# Patient Record
Sex: MALE
[De-identification: names, ages, dates, MRNs, and addresses within clinical notes are randomized per-mention and may not be internally consistent; named-entity substitution may affect disease eponyms.]

---

## 2019-03-05 ENCOUNTER — APPOINTMENT (OUTPATIENT)
Dept: OTOLARYNGOLOGY | Facility: CLINIC | Age: 59
End: 2019-03-05
Payer: COMMERCIAL

## 2019-03-05 VITALS
DIASTOLIC BLOOD PRESSURE: 70 MMHG | HEIGHT: 70 IN | WEIGHT: 175 LBS | SYSTOLIC BLOOD PRESSURE: 111 MMHG | HEART RATE: 56 BPM | BODY MASS INDEX: 25.05 KG/M2

## 2019-03-05 DIAGNOSIS — Z86.69 PERSONAL HISTORY OF OTHER DISEASES OF THE NERVOUS SYSTEM AND SENSE ORGANS: ICD-10-CM

## 2019-03-05 PROBLEM — Z00.00 ENCOUNTER FOR PREVENTIVE HEALTH EXAMINATION: Status: ACTIVE | Noted: 2019-03-05

## 2019-03-05 PROCEDURE — 69801 INCISE INNER EAR: CPT | Mod: RT

## 2019-03-05 PROCEDURE — 99204 OFFICE O/P NEW MOD 45 MIN: CPT | Mod: 25

## 2019-03-05 PROCEDURE — 92504 EAR MICROSCOPY EXAMINATION: CPT

## 2019-03-05 NOTE — REASON FOR VISIT
[Spouse] : spouse [Initial Consultation] : an initial consultation for [FreeTextEntry2] : sudden b/l hearing loss

## 2019-03-05 NOTE — DATA REVIEWED
[de-identified] : jason REYNOLDS, R sudden loss 7 days ago [de-identified] : no IAC/CPA lesions, reviewed disc with patient

## 2019-03-05 NOTE — HISTORY OF PRESENT ILLNESS
[Anxiety] : anxiety [Hearing Loss] : hearing loss [de-identified] : 59 y/o male presents with spouse for evaluation of sudden b/l hearing loss. Pt has hx of hearing loss and is seen by ENT (Murray Mccullough MD in [place]) who states his hearing loss is 65% Right and 35% Left. Last Tuesday (2/26/19) sudden hearing loss was noted, unable to hear in Right ear and minimal in Left. Pt saw same ENT on day of hearing loss, who referred pt here and  placed pt on PO steroids without any improvement. Pt denies any recent colds, ear infections, pain, drainage, h/a, some dizziness when waking up. Pt does snore at night. Pt states spoken words lacks clarity.  [Ear Fullness] : no ear fullness [Tinnitus] : no tinnitus [Dizziness] : no dizziness [Headache] : no headache [Lightheadedness] : no lightheadedness [Neurologic Symptoms] : no associated neurologic symptoms [Orthostatic Hypotension] : no orthostatic hypotension [Otalgia] : no otalgia [Otorrhea] : no otorrhea [Vertigo] : no vertigo [Visual Changes] : no visual changes

## 2019-03-05 NOTE — PROCEDURE
[Risk and Benefits Discussed] : The purpose, risks, discomforts, benefits and alternatives of the procedure have been explained to the patient including no treatment. [facility] [Sudden Hearing Loss] : Sudden Hearing Loss [Same] : same as the Pre Op Dx. [] : Binocular Microscopy [FreeTextEntry1] : R sudden loss [FreeTextEntry4] : topical phenol [FreeTextEntry6] : After appropriate consent and identification of laterailty for procedure, the patient placed in supine position with head turned. Topical phenol utilized to anesthetize superior aspect of tympanic membrane. Using a 25g needle a small ventilating hole made followed by inferiorly placed injection site. Solumedrol was slowly injected intratympanically, until the entire middle ear was filled. Patient remained supine with head turned for at least 20 minutes with instruction to avoid swallowing or talking. Tolerated the procedure well without complications.\par

## 2019-03-19 ENCOUNTER — APPOINTMENT (OUTPATIENT)
Dept: OTOLARYNGOLOGY | Facility: CLINIC | Age: 59
End: 2019-03-19
Payer: COMMERCIAL

## 2019-03-19 VITALS
HEIGHT: 70 IN | WEIGHT: 175 LBS | BODY MASS INDEX: 25.05 KG/M2 | SYSTOLIC BLOOD PRESSURE: 119 MMHG | HEART RATE: 66 BPM | DIASTOLIC BLOOD PRESSURE: 79 MMHG

## 2019-03-19 DIAGNOSIS — Z87.891 PERSONAL HISTORY OF NICOTINE DEPENDENCE: ICD-10-CM

## 2019-03-19 PROCEDURE — 99213 OFFICE O/P EST LOW 20 MIN: CPT | Mod: 25

## 2019-03-19 PROCEDURE — 69801 INCISE INNER EAR: CPT | Mod: RT

## 2019-03-19 PROCEDURE — 92504 EAR MICROSCOPY EXAMINATION: CPT

## 2019-03-19 RX ORDER — PREDNISONE 20 MG/1
20 TABLET ORAL
Refills: 0 | Status: DISCONTINUED | COMMUNITY
End: 2019-03-19

## 2019-03-20 NOTE — REASON FOR VISIT
[Subsequent Evaluation] : a subsequent evaluation for [Spouse] : spouse [FreeTextEntry2] : b/l hearing loss

## 2019-03-20 NOTE — HISTORY OF PRESENT ILLNESS
[de-identified] : 59 y/o here for f/u for sudden R SNHL.  Pt finished course of PO steroids 20mg. Pt and spouse think the hearing is better in the right ear but with close proximity and not when talking far away. Pt still c/o of "noises" in both ears. Pt still awaiting to be called in for L HA consult. Pt has not returned to work.

## 2019-03-20 NOTE — PROCEDURE
[Risk and Benefits Discussed] : The purpose, risks, discomforts, benefits and alternatives of the procedure have been explained to the patient including no treatment. [facility] [Sudden Hearing Loss] : Sudden Hearing Loss [Same] : same as the Pre Op Dx. [] : Binocular Microscopy [FreeTextEntry1] : [facility]IR[facility] [FreeTextEntry4] : topical phenol [FreeTextEntry6] : After appropriate consent and identification of laterailty for procedure, the patient placed in supine position with head turned. Topical phenol utilized to anesthetize superior aspect of tympanic membrane. Using a 25g needle a small ventilating hole made followed by inferiorly placed injection site. Solumedrol was slowly injected intratympanically, until the entire middle ear was filled. Patient remained supine with head turned for at least 10 minutes with instruction to avoid swallowing or talking. Tolerated the procedure well without complications.\par

## 2019-04-02 ENCOUNTER — APPOINTMENT (OUTPATIENT)
Dept: OTOLARYNGOLOGY | Facility: CLINIC | Age: 59
End: 2019-04-02
Payer: COMMERCIAL

## 2019-04-02 VITALS
SYSTOLIC BLOOD PRESSURE: 106 MMHG | WEIGHT: 175 LBS | HEIGHT: 70 IN | HEART RATE: 64 BPM | DIASTOLIC BLOOD PRESSURE: 70 MMHG | BODY MASS INDEX: 25.05 KG/M2

## 2019-04-02 PROCEDURE — 92557 COMPREHENSIVE HEARING TEST: CPT

## 2019-04-02 PROCEDURE — 99213 OFFICE O/P EST LOW 20 MIN: CPT | Mod: 25

## 2019-04-02 PROCEDURE — 92567 TYMPANOMETRY: CPT

## 2019-04-02 NOTE — REASON FOR VISIT
[Subsequent Evaluation] : a subsequent evaluation for [Spouse] : spouse [FreeTextEntry2] : f/u for hearing loss in the left ear

## 2019-04-02 NOTE — HISTORY OF PRESENT ILLNESS
[de-identified] : 57 y/o male here f/u for hearing loss in the left ear. Pt with 2 IT steroid injections during last visit, each time.  Pt states his hearing fluctuates day to day; overall has noted some improvement. Pt went to a dispensary in [place] to try out left HA.

## 2019-07-30 ENCOUNTER — APPOINTMENT (OUTPATIENT)
Dept: OTOLARYNGOLOGY | Facility: CLINIC | Age: 59
End: 2019-07-30
Payer: COMMERCIAL

## 2019-07-30 VITALS
HEIGHT: 70 IN | BODY MASS INDEX: 25.05 KG/M2 | HEART RATE: 62 BPM | DIASTOLIC BLOOD PRESSURE: 73 MMHG | SYSTOLIC BLOOD PRESSURE: 112 MMHG | WEIGHT: 175 LBS

## 2019-07-30 PROCEDURE — 92567 TYMPANOMETRY: CPT

## 2019-07-30 PROCEDURE — 99213 OFFICE O/P EST LOW 20 MIN: CPT | Mod: 25

## 2019-07-30 PROCEDURE — 92557 COMPREHENSIVE HEARING TEST: CPT

## 2019-07-30 NOTE — REASON FOR VISIT
[Subsequent Evaluation] : a subsequent evaluation for [FreeTextEntry2] : follow up bilateral hearing loss

## 2019-07-30 NOTE — HISTORY OF PRESENT ILLNESS
[de-identified] : 58 year old male follow up bilateral hearing loss, denies improvement since last visit. Bilateral hearing aids since March 2019. Denies otalgia, otorrhea, ear infections, tinnitus, dizziness, vertigo, headaches related to hearing. Last audiogram 4/2/19.\par \par

## 2019-08-26 ENCOUNTER — APPOINTMENT (OUTPATIENT)
Dept: PHARMACY | Facility: CLINIC | Age: 59
End: 2019-08-26
Payer: COMMERCIAL

## 2019-08-26 PROCEDURE — V5010 ASSESSMENT FOR HEARING AID: CPT | Mod: NC

## 2020-09-02 ENCOUNTER — APPOINTMENT (OUTPATIENT)
Dept: OTOLARYNGOLOGY | Facility: CLINIC | Age: 60
End: 2020-09-02
Payer: MEDICAID

## 2020-09-02 VITALS
HEART RATE: 62 BPM | DIASTOLIC BLOOD PRESSURE: 69 MMHG | SYSTOLIC BLOOD PRESSURE: 114 MMHG | HEIGHT: 70 IN | BODY MASS INDEX: 24.34 KG/M2 | WEIGHT: 170 LBS

## 2020-09-02 PROCEDURE — 92557 COMPREHENSIVE HEARING TEST: CPT

## 2020-09-02 PROCEDURE — 92567 TYMPANOMETRY: CPT

## 2020-09-02 PROCEDURE — 99214 OFFICE O/P EST MOD 30 MIN: CPT | Mod: 25

## 2020-09-02 RX ORDER — ALPRAZOLAM 2 MG/1
TABLET ORAL
Refills: 0 | Status: DISCONTINUED | COMMUNITY
End: 2020-09-02

## 2020-09-02 NOTE — HISTORY OF PRESENT ILLNESS
[de-identified] : 59 year old man follow up bilateral hearing loss.  Wearing bilateral hearing aids. States hasn't been able to get used to the hearing aids.  Patient denies otalgia, otorrhea, ear infections, tinnitus, dizziness, vertigo, headaches related to hearing.\par

## 2020-09-02 NOTE — REASON FOR VISIT
[Subsequent Evaluation] : a subsequent evaluation for [FreeTextEntry2] : follow up for bilateral hearing loss

## 2020-10-06 ENCOUNTER — OUTPATIENT (OUTPATIENT)
Dept: OUTPATIENT SERVICES | Facility: HOSPITAL | Age: 60
LOS: 1 days | Discharge: HOME | End: 2020-10-06

## 2020-10-06 ENCOUNTER — APPOINTMENT (OUTPATIENT)
Dept: SPEECH THERAPY | Facility: CLINIC | Age: 60
End: 2020-10-06

## 2020-10-15 DIAGNOSIS — H90.3 SENSORINEURAL HEARING LOSS, BILATERAL: ICD-10-CM

## 2021-03-15 ENCOUNTER — APPOINTMENT (OUTPATIENT)
Dept: PHARMACY | Facility: CLINIC | Age: 61
End: 2021-03-15

## 2021-10-26 ENCOUNTER — APPOINTMENT (OUTPATIENT)
Dept: OTOLARYNGOLOGY | Facility: CLINIC | Age: 61
End: 2021-10-26
Payer: MEDICAID

## 2021-10-26 PROCEDURE — 92557 COMPREHENSIVE HEARING TEST: CPT

## 2021-10-26 PROCEDURE — 92504 EAR MICROSCOPY EXAMINATION: CPT

## 2021-10-26 PROCEDURE — 92567 TYMPANOMETRY: CPT

## 2021-10-26 PROCEDURE — 99213 OFFICE O/P EST LOW 20 MIN: CPT

## 2021-10-26 NOTE — HISTORY OF PRESENT ILLNESS
[de-identified] : 60 year old man follow up bilateral hearing loss.  Wearing bilateral hearing aids. States hasn't been able to get used to the hearing aids.  Patient denies otalgia, otorrhea, ear infections, tinnitus, dizziness, vertigo, headaches related to hearing.\par has not had HAs checked in a while; having fit issues\par

## 2021-10-26 NOTE — PROCEDURE
[Other ___] : [unfilled] [Same] : same as the Pre Op Dx. [] : Binocular Microscopy [FreeTextEntry1] : jason REYNOLDS [FreeTextEntry4] : none [FreeTextEntry6] : Operative microscope was used to examine the ear canal, ear drum and visible middle ear landmarks. Adequate exam would not have been possible without the use of a microscope. Findings are described.\par \par

## 2022-04-12 ENCOUNTER — APPOINTMENT (OUTPATIENT)
Dept: OTOLARYNGOLOGY | Facility: CLINIC | Age: 62
End: 2022-04-12
Payer: MEDICAID

## 2022-04-12 VITALS — HEART RATE: 67 BPM | SYSTOLIC BLOOD PRESSURE: 103 MMHG | DIASTOLIC BLOOD PRESSURE: 65 MMHG

## 2022-04-12 VITALS — WEIGHT: 170 LBS | BODY MASS INDEX: 24.34 KG/M2 | HEIGHT: 70 IN

## 2022-04-12 DIAGNOSIS — H90.3 SENSORINEURAL HEARING LOSS, BILATERAL: ICD-10-CM

## 2022-04-12 PROCEDURE — 92567 TYMPANOMETRY: CPT

## 2022-04-12 PROCEDURE — 92557 COMPREHENSIVE HEARING TEST: CPT | Mod: 52

## 2022-04-12 PROCEDURE — 99213 OFFICE O/P EST LOW 20 MIN: CPT

## 2022-04-12 NOTE — PHYSICAL EXAM
[Midline] : trachea located in midline position [Binocular Microscopic Exam] : Binocular microscopic exam was performed [Normal] : the left mastoid was normal [Hearing Loss Right Only] : normal [Hearing Loss Left Only] : normal

## 2022-04-12 NOTE — DATA REVIEWED
[de-identified] : An audiogram was ordered and performed including tympanometry, pure tones and speech, for patient's complaint of hearing loss\par I have independently reviewed the patient's audiogram from today and my findings include jason SNHL SDS in 80s; aided to 100%\par

## 2022-04-12 NOTE — HISTORY OF PRESENT ILLNESS
[de-identified] : 61 year old man presents for evaluation of left ear.\par Patient reports having left ear itchiness left otalgia and congestion.\par Symptoms began 4 days ago.\par States that he was unable to put in his hearing aid due to inflammation in the ear.\par Sarah noticeable otorrhea, tinnitus, dizziness and vertigo.

## 2022-09-06 ENCOUNTER — APPOINTMENT (OUTPATIENT)
Dept: OTOLARYNGOLOGY | Facility: CLINIC | Age: 62
End: 2022-09-06

## 2023-02-02 ENCOUNTER — APPOINTMENT (OUTPATIENT)
Dept: OTOLARYNGOLOGY | Facility: CLINIC | Age: 63
End: 2023-02-02
Payer: MEDICAID

## 2023-02-02 VITALS
BODY MASS INDEX: 24.73 KG/M2 | DIASTOLIC BLOOD PRESSURE: 69 MMHG | HEART RATE: 65 BPM | HEIGHT: 69 IN | SYSTOLIC BLOOD PRESSURE: 111 MMHG | WEIGHT: 167 LBS | TEMPERATURE: 98 F

## 2023-02-02 DIAGNOSIS — H91.21 SUDDEN IDIOPATHIC HEARING LOSS, RIGHT EAR: ICD-10-CM

## 2023-02-02 DIAGNOSIS — H91.22 SUDDEN IDIOPATHIC HEARING LOSS, LEFT EAR: ICD-10-CM

## 2023-02-02 PROCEDURE — 92504 EAR MICROSCOPY EXAMINATION: CPT

## 2023-02-02 PROCEDURE — 99213 OFFICE O/P EST LOW 20 MIN: CPT

## 2023-02-02 PROCEDURE — 92567 TYMPANOMETRY: CPT

## 2023-02-02 PROCEDURE — 92557 COMPREHENSIVE HEARING TEST: CPT

## 2023-02-02 NOTE — REASON FOR VISIT
[Subsequent Evaluation] : a subsequent evaluation for [FreeTextEntry2] : 63yo man with jason SNHL with 15dB decline since last audio (6mos) on R. needs to adjust PRATHER to new audio.  f/u yearly.

## 2023-02-02 NOTE — PROCEDURE
[Sudden Hearing Loss] : Sudden Hearing Loss [Same] : same as the Pre Op Dx. [] : Binocular Microscopy [FreeTextEntry1] : hearing loss\par  [FreeTextEntry4] : none [FreeTextEntry6] : Operative microscope was used to examine the ear canal, ear drum and visible middle ear landmarks. Adequate exam would not have been possible without the use of a microscope. Findings are described.\par \par

## 2023-02-02 NOTE — DATA REVIEWED
[de-identified] : An audiogram was ordered and performed including tympanometry, pure tones and speech, for patient's complaint of hearing loss\par I have independently reviewed the patient's audiogram from today and my findings include jason SNHL with SDS in 80s

## 2024-02-06 ENCOUNTER — APPOINTMENT (OUTPATIENT)
Dept: OTOLARYNGOLOGY | Facility: CLINIC | Age: 64
End: 2024-02-06
Payer: MEDICARE

## 2024-02-06 VITALS
TEMPERATURE: 98 F | WEIGHT: 168 LBS | SYSTOLIC BLOOD PRESSURE: 111 MMHG | HEART RATE: 80 BPM | HEIGHT: 69 IN | BODY MASS INDEX: 24.88 KG/M2 | DIASTOLIC BLOOD PRESSURE: 65 MMHG

## 2024-02-06 DIAGNOSIS — H90.3 SENSORINEURAL HEARING LOSS, BILATERAL: ICD-10-CM

## 2024-02-06 PROCEDURE — 92504 EAR MICROSCOPY EXAMINATION: CPT

## 2024-02-06 PROCEDURE — 99213 OFFICE O/P EST LOW 20 MIN: CPT

## 2024-02-06 PROCEDURE — 92557 COMPREHENSIVE HEARING TEST: CPT

## 2024-02-06 PROCEDURE — 92567 TYMPANOMETRY: CPT

## 2024-02-06 NOTE — DATA REVIEWED
[de-identified] : An audiogram was ordered and performed including tympanometry, pure tones and speech, for patient's complaint of hearing loss\par  I have independently reviewed the patient's audiogram from today and my findings include jason SNHL with SDS in 80s

## 2024-02-06 NOTE — PROCEDURE
[Sudden Hearing Loss] : Sudden Hearing Loss [Same] : same as the Pre Op Dx. [FreeTextEntry1] : hearing loss\par   [] : Binocular Microscopy [FreeTextEntry4] : none [FreeTextEntry6] : Operative microscope was used to examine the ear canal, ear drum and visible middle ear landmarks. Adequate exam would not have been possible without the use of a microscope. Findings are described.\par  \par

## 2024-02-06 NOTE — HISTORY OF PRESENT ILLNESS
[de-identified] : hearing aids with poor function unable to hear the TV cannot use the phone with HAs

## 2024-02-06 NOTE — REASON FOR VISIT
[Subsequent Evaluation] : a subsequent evaluation for [FreeTextEntry2] : 62yo man with jason SNHL and poor perceived benefit from HAs; prior CI eval was deemed not candidate (2020). i suggested better fit HAs/change of technology and discussed OTC options as well. can try another CI eval. f/u yearly or prn.

## 2024-03-01 ENCOUNTER — APPOINTMENT (OUTPATIENT)
Dept: PHARMACY | Facility: CLINIC | Age: 64
End: 2024-03-01

## 2024-03-13 ENCOUNTER — OFFICE (OUTPATIENT)
Dept: URBAN - METROPOLITAN AREA CLINIC 35 | Facility: CLINIC | Age: 64
Setting detail: OPHTHALMOLOGY
End: 2024-03-13

## 2024-03-13 DIAGNOSIS — H90.3: ICD-10-CM

## 2024-03-13 PROCEDURE — 92593 HEARING AID CHECK; BINAURAL: CPT | Performed by: AUDIOLOGIST

## 2024-06-03 ENCOUNTER — NON-APPOINTMENT (OUTPATIENT)
Age: 64
End: 2024-06-03

## 2024-06-03 ENCOUNTER — APPOINTMENT (OUTPATIENT)
Dept: OPHTHALMOLOGY | Facility: CLINIC | Age: 64
End: 2024-06-03
Payer: MEDICAID

## 2024-06-03 PROCEDURE — 92201 OPSCPY EXTND RTA DRAW UNI/BI: CPT

## 2024-06-03 PROCEDURE — 92134 CPTRZ OPH DX IMG PST SGM RTA: CPT

## 2024-06-03 PROCEDURE — 92004 COMPRE OPH EXAM NEW PT 1/>: CPT

## 2024-11-01 ENCOUNTER — APPOINTMENT (OUTPATIENT)
Dept: OPHTHALMOLOGY | Facility: CLINIC | Age: 64
End: 2024-11-01

## 2025-04-22 ENCOUNTER — RX ONLY (RX ONLY)
Age: 65
End: 2025-04-22

## 2025-04-22 ENCOUNTER — DOCTOR'S OFFICE (OUTPATIENT)
Facility: LOCATION | Age: 65
Setting detail: OPHTHALMOLOGY
End: 2025-04-22
Payer: COMMERCIAL

## 2025-04-22 DIAGNOSIS — H44.23: ICD-10-CM

## 2025-04-22 DIAGNOSIS — H33.311: ICD-10-CM

## 2025-04-22 DIAGNOSIS — H35.371: ICD-10-CM

## 2025-04-22 DIAGNOSIS — H35.431: ICD-10-CM

## 2025-04-22 DIAGNOSIS — H35.3223: ICD-10-CM

## 2025-04-22 DIAGNOSIS — H35.40: ICD-10-CM

## 2025-04-22 DIAGNOSIS — H43.393: ICD-10-CM

## 2025-04-22 DIAGNOSIS — H35.3111: ICD-10-CM

## 2025-04-22 PROBLEM — H35.373 ERM; BOTH EYES: Status: ACTIVE | Noted: 2025-04-22

## 2025-04-22 PROBLEM — H35.363 DRUSEN; BOTH EYES: Status: ACTIVE | Noted: 2025-04-22

## 2025-04-22 PROCEDURE — 92235 FLUORESCEIN ANGRPH MLTIFRAME: CPT | Performed by: OPHTHALMOLOGY

## 2025-04-22 PROCEDURE — 92201 OPSCPY EXTND RTA DRAW UNI/BI: CPT | Performed by: OPHTHALMOLOGY

## 2025-04-22 PROCEDURE — 92134 CPTRZ OPH DX IMG PST SGM RTA: CPT | Performed by: OPHTHALMOLOGY

## 2025-04-22 PROCEDURE — 92014 COMPRE OPH EXAM EST PT 1/>: CPT | Performed by: OPHTHALMOLOGY

## 2025-04-22 ASSESSMENT — VISUAL ACUITY
OD_BCVA: 20/500
OS_BCVA: 20/25-2

## 2025-04-22 ASSESSMENT — CONFRONTATIONAL VISUAL FIELD TEST (CVF)
OD_FINDINGS: FULL
OS_FINDINGS: FULL